# Patient Record
Sex: MALE | Race: WHITE | NOT HISPANIC OR LATINO | Employment: FULL TIME | URBAN - METROPOLITAN AREA
[De-identification: names, ages, dates, MRNs, and addresses within clinical notes are randomized per-mention and may not be internally consistent; named-entity substitution may affect disease eponyms.]

---

## 2017-09-15 ENCOUNTER — OFFICE VISIT (OUTPATIENT)
Dept: URGENT CARE | Facility: PHYSICIAN GROUP | Age: 57
End: 2017-09-15

## 2017-09-15 VITALS
BODY MASS INDEX: 31.4 KG/M2 | RESPIRATION RATE: 12 BRPM | HEART RATE: 92 BPM | OXYGEN SATURATION: 94 % | WEIGHT: 195.4 LBS | HEIGHT: 66 IN | TEMPERATURE: 98 F

## 2017-09-15 DIAGNOSIS — Z00.00 PHYSICAL EXAM, ROUTINE: ICD-10-CM

## 2017-09-15 PROCEDURE — 7100 PR DOT PHYSICAL: Performed by: PHYSICIAN ASSISTANT

## 2017-09-15 ASSESSMENT — PAIN SCALES - GENERAL: PAINLEVEL: NO PAIN

## 2018-09-18 ENCOUNTER — OFFICE VISIT (OUTPATIENT)
Dept: URGENT CARE | Facility: PHYSICIAN GROUP | Age: 58
End: 2018-09-18

## 2018-09-18 VITALS
SYSTOLIC BLOOD PRESSURE: 138 MMHG | TEMPERATURE: 97.5 F | DIASTOLIC BLOOD PRESSURE: 86 MMHG | OXYGEN SATURATION: 97 % | HEIGHT: 66 IN | HEART RATE: 86 BPM | WEIGHT: 201 LBS | BODY MASS INDEX: 32.3 KG/M2

## 2018-09-18 DIAGNOSIS — Z02.4 ENCOUNTER FOR CDL (COMMERCIAL DRIVING LICENSE) EXAM: ICD-10-CM

## 2018-09-18 PROCEDURE — 7100 PR DOT PHYSICAL: Performed by: FAMILY MEDICINE

## 2018-09-18 NOTE — PROGRESS NOTES
For complete documentation please see DOT physical form scanned into chart. Chaperone name: Ana was present.

## 2019-11-04 ENCOUNTER — TELEPHONE (OUTPATIENT)
Dept: MEDICAL GROUP | Age: 59
End: 2019-11-04

## 2019-11-04 DIAGNOSIS — E78.2 MIXED HYPERLIPIDEMIA: ICD-10-CM

## 2019-11-04 DIAGNOSIS — Z00.00 HEALTHCARE MAINTENANCE: ICD-10-CM

## 2019-11-11 RX ORDER — LISINOPRIL 40 MG/1
TABLET ORAL
Qty: 90 TAB | Refills: 4 | Status: SHIPPED | OUTPATIENT
Start: 2019-11-11 | End: 2020-11-12

## 2019-11-15 NOTE — TELEPHONE ENCOUNTER
1. Caller Name: Kit Rocha      Call Back Number: [phone] (home)         Patient approves a detailed voicemail message: N\A    Pt is requesting lab orders to complete, so he can schedule an appointment to review them

## 2019-11-15 NOTE — TELEPHONE ENCOUNTER
Phone Number Called: [phone] (home)     Call outcome: left message for patient to call back regarding message below    Message: Left message for the patient letting him know that Dr. Turner has ordered blood work.

## 2019-11-20 ENCOUNTER — HOSPITAL ENCOUNTER (OUTPATIENT)
Dept: LAB | Facility: MEDICAL CENTER | Age: 59
End: 2019-11-20
Attending: INTERNAL MEDICINE
Payer: COMMERCIAL

## 2019-11-20 DIAGNOSIS — Z00.00 HEALTHCARE MAINTENANCE: ICD-10-CM

## 2019-11-20 LAB
ALBUMIN SERPL BCP-MCNC: 4.1 G/DL (ref 3.2–4.9)
ALBUMIN/GLOB SERPL: 1.2 G/DL
ALP SERPL-CCNC: 82 U/L (ref 30–99)
ALT SERPL-CCNC: 25 U/L (ref 2–50)
ANION GAP SERPL CALC-SCNC: 8 MMOL/L (ref 0–11.9)
AST SERPL-CCNC: 23 U/L (ref 12–45)
BASOPHILS # BLD AUTO: 0.4 % (ref 0–1.8)
BASOPHILS # BLD: 0.02 K/UL (ref 0–0.12)
BILIRUB SERPL-MCNC: 0.5 MG/DL (ref 0.1–1.5)
BUN SERPL-MCNC: 15 MG/DL (ref 8–22)
CALCIUM SERPL-MCNC: 9.1 MG/DL (ref 8.5–10.5)
CHLORIDE SERPL-SCNC: 107 MMOL/L (ref 96–112)
CHOLEST SERPL-MCNC: 191 MG/DL (ref 100–199)
CO2 SERPL-SCNC: 26 MMOL/L (ref 20–33)
CREAT SERPL-MCNC: 0.94 MG/DL (ref 0.5–1.4)
EOSINOPHIL # BLD AUTO: 0.07 K/UL (ref 0–0.51)
EOSINOPHIL NFR BLD: 1.3 % (ref 0–6.9)
ERYTHROCYTE [DISTWIDTH] IN BLOOD BY AUTOMATED COUNT: 46.9 FL (ref 35.9–50)
FASTING STATUS PATIENT QL REPORTED: NORMAL
GLOBULIN SER CALC-MCNC: 3.3 G/DL (ref 1.9–3.5)
GLUCOSE SERPL-MCNC: 96 MG/DL (ref 65–99)
HCT VFR BLD AUTO: 51.6 % (ref 42–52)
HDLC SERPL-MCNC: 38 MG/DL
HGB BLD-MCNC: 17.1 G/DL (ref 14–18)
IMM GRANULOCYTES # BLD AUTO: 0.01 K/UL (ref 0–0.11)
IMM GRANULOCYTES NFR BLD AUTO: 0.2 % (ref 0–0.9)
LDLC SERPL CALC-MCNC: 132 MG/DL
LYMPHOCYTES # BLD AUTO: 1.56 K/UL (ref 1–4.8)
LYMPHOCYTES NFR BLD: 29.2 % (ref 22–41)
MCH RBC QN AUTO: 31.4 PG (ref 27–33)
MCHC RBC AUTO-ENTMCNC: 33.1 G/DL (ref 33.7–35.3)
MCV RBC AUTO: 94.7 FL (ref 81.4–97.8)
MONOCYTES # BLD AUTO: 0.56 K/UL (ref 0–0.85)
MONOCYTES NFR BLD AUTO: 10.5 % (ref 0–13.4)
NEUTROPHILS # BLD AUTO: 3.13 K/UL (ref 1.82–7.42)
NEUTROPHILS NFR BLD: 58.4 % (ref 44–72)
NRBC # BLD AUTO: 0 K/UL
NRBC BLD-RTO: 0 /100 WBC
PLATELET # BLD AUTO: 219 K/UL (ref 164–446)
PMV BLD AUTO: 11 FL (ref 9–12.9)
POTASSIUM SERPL-SCNC: 4.6 MMOL/L (ref 3.6–5.5)
PROT SERPL-MCNC: 7.4 G/DL (ref 6–8.2)
RBC # BLD AUTO: 5.45 M/UL (ref 4.7–6.1)
SODIUM SERPL-SCNC: 141 MMOL/L (ref 135–145)
TRIGL SERPL-MCNC: 103 MG/DL (ref 0–149)
WBC # BLD AUTO: 5.4 K/UL (ref 4.8–10.8)

## 2019-11-20 PROCEDURE — 80053 COMPREHEN METABOLIC PANEL: CPT

## 2019-11-20 PROCEDURE — 80061 LIPID PANEL: CPT

## 2019-11-20 PROCEDURE — 36415 COLL VENOUS BLD VENIPUNCTURE: CPT

## 2019-11-20 PROCEDURE — 85025 COMPLETE CBC W/AUTO DIFF WBC: CPT

## 2020-08-11 ENCOUNTER — HOSPITAL ENCOUNTER (OUTPATIENT)
Facility: MEDICAL CENTER | Age: 60
End: 2020-08-11
Attending: PHYSICIAN ASSISTANT
Payer: COMMERCIAL

## 2020-08-11 ENCOUNTER — TELEMEDICINE (OUTPATIENT)
Dept: MEDICAL GROUP | Age: 60
End: 2020-08-11
Payer: COMMERCIAL

## 2020-08-11 ENCOUNTER — OFFICE VISIT (OUTPATIENT)
Dept: URGENT CARE | Facility: CLINIC | Age: 60
End: 2020-08-11
Payer: COMMERCIAL

## 2020-08-11 VITALS
WEIGHT: 182 LBS | HEIGHT: 66 IN | RESPIRATION RATE: 14 BRPM | OXYGEN SATURATION: 93 % | DIASTOLIC BLOOD PRESSURE: 68 MMHG | BODY MASS INDEX: 29.25 KG/M2 | SYSTOLIC BLOOD PRESSURE: 138 MMHG | HEART RATE: 89 BPM | TEMPERATURE: 96.8 F

## 2020-08-11 VITALS
HEART RATE: 76 BPM | TEMPERATURE: 96.8 F | RESPIRATION RATE: 12 BRPM | SYSTOLIC BLOOD PRESSURE: 136 MMHG | DIASTOLIC BLOOD PRESSURE: 76 MMHG | WEIGHT: 176 LBS | BODY MASS INDEX: 28.28 KG/M2 | HEIGHT: 66 IN

## 2020-08-11 DIAGNOSIS — E78.2 MIXED HYPERLIPIDEMIA: ICD-10-CM

## 2020-08-11 DIAGNOSIS — Z20.822 EXPOSURE TO COVID-19 VIRUS: ICD-10-CM

## 2020-08-11 DIAGNOSIS — Z20.822 CLOSE EXPOSURE TO COVID-19 VIRUS: ICD-10-CM

## 2020-08-11 DIAGNOSIS — Z00.00 HEALTHCARE MAINTENANCE: ICD-10-CM

## 2020-08-11 DIAGNOSIS — I10 ESSENTIAL HYPERTENSION: ICD-10-CM

## 2020-08-11 LAB — COVID ORDER STATUS COVID19: NORMAL

## 2020-08-11 PROCEDURE — U0003 INFECTIOUS AGENT DETECTION BY NUCLEIC ACID (DNA OR RNA); SEVERE ACUTE RESPIRATORY SYNDROME CORONAVIRUS 2 (SARS-COV-2) (CORONAVIRUS DISEASE [COVID-19]), AMPLIFIED PROBE TECHNIQUE, MAKING USE OF HIGH THROUGHPUT TECHNOLOGIES AS DESCRIBED BY CMS-2020-01-R: HCPCS

## 2020-08-11 PROCEDURE — 99213 OFFICE O/P EST LOW 20 MIN: CPT | Mod: 95,CR | Performed by: INTERNAL MEDICINE

## 2020-08-11 PROCEDURE — 99213 OFFICE O/P EST LOW 20 MIN: CPT | Mod: CS | Performed by: PHYSICIAN ASSISTANT

## 2020-08-11 SDOH — HEALTH STABILITY: MENTAL HEALTH: HOW OFTEN DO YOU HAVE A DRINK CONTAINING ALCOHOL?: 2-4 TIMES A MONTH

## 2020-08-11 ASSESSMENT — FIBROSIS 4 INDEX
FIB4 SCORE: 1.22
FIB4 SCORE: 1.22

## 2020-08-11 ASSESSMENT — ENCOUNTER SYMPTOMS
COUGH: 1
FEVER: 0
SHORTNESS OF BREATH: 0

## 2020-08-11 NOTE — PROGRESS NOTES
"  Subjective:   Kit Rocha is a 60 y.o. male who presents today with   Chief Complaint   Patient presents with   • Runny Nose     x couple days - daughter tested and positive        Other  This is a new problem. Episode onset: 2 days. The problem occurs constantly. The problem has been unchanged. Associated symptoms include congestion and coughing. Pertinent negatives include no chest pain or fever. Associated symptoms comments: Runny nose. Nothing aggravates the symptoms. He has tried nothing for the symptoms. The treatment provided no relief.     Patient's daughter tested positive for COVID.  PMH:  has a past medical history of Hypertension. He also has no past medical history of Clotting disorder (HCC) or Diabetes (HCC).  MEDS:   Current Outpatient Medications:   •  lisinopril (PRINIVIL) 40 MG tablet, TAKE 1 TABLET BY MOUTH EVERY DAY, Disp: 90 Tab, Rfl: 4  ALLERGIES: No Known Allergies  SURGHX:   Past Surgical History:   Procedure Laterality Date   • HERNIA REPAIR       SOCHX:  reports that he has never smoked. He has never used smokeless tobacco. He reports previous alcohol use. He reports that he does not use drugs.  FH: Reviewed with patient, not pertinent to this visit.       Review of Systems   Constitutional: Negative for fever.   HENT: Positive for congestion.    Respiratory: Positive for cough. Negative for shortness of breath.    Cardiovascular: Negative for chest pain.   All other systems reviewed and are negative.       Objective:   /68 (BP Location: Left arm, Patient Position: Sitting, BP Cuff Size: Adult)   Pulse 89   Temp 36 °C (96.8 °F) (Temporal)   Resp 14   Ht 1.676 m (5' 6\")   Wt 82.6 kg (182 lb)   SpO2 93%   BMI 29.38 kg/m²   Physical Exam  Vitals signs and nursing note reviewed.   Constitutional:       General: He is not in acute distress.     Appearance: Normal appearance. He is well-developed and normal weight. He is not ill-appearing or toxic-appearing.   HENT:      Head: " Normocephalic and atraumatic.      Right Ear: Hearing, tympanic membrane and ear canal normal.      Left Ear: Hearing, tympanic membrane and ear canal normal.      Nose: Rhinorrhea present.      Mouth/Throat:      Pharynx: Posterior oropharyngeal erythema present. No oropharyngeal exudate.   Eyes:      Pupils: Pupils are equal, round, and reactive to light.   Cardiovascular:      Rate and Rhythm: Normal rate and regular rhythm.      Heart sounds: Normal heart sounds.   Pulmonary:      Effort: Pulmonary effort is normal. No respiratory distress.      Breath sounds: Normal breath sounds. No stridor. No wheezing, rhonchi or rales.   Chest:      Chest wall: No tenderness.   Musculoskeletal:      Comments: Normal movement in all 4 extremities   Lymphadenopathy:      Cervical: No cervical adenopathy.   Skin:     General: Skin is warm and dry.   Neurological:      Mental Status: He is alert.      Coordination: Coordination normal.   Psychiatric:         Mood and Affect: Mood normal.           Assessment/Plan:   Assessment    1. Close exposure to COVID-19 virus  - COVID/SARS COV-2 PCR; Future  Patient encouraged to get plenty of rest, use OTC tylenol for pain/fever, and drink plenty of fluids.  Discussed CDC guidelines and self quarantine.  Differential diagnosis, natural history, supportive care, and indications for immediate follow-up discussed.   Patient given instructions and understanding of medications and treatment.    If not improving in 3-5 days, F/U with PCP or return to  if symptoms worsen.    Patient agreeable to plan.      Please note that this dictation was created using voice recognition software. I have made every reasonable attempt to correct obvious errors, but I expect that there are errors of grammar and possibly content that I did not discover before finalizing the note.    Geoff Goode PA-C

## 2020-08-11 NOTE — PROGRESS NOTES
"Telemedicine Visit: Established Patient     This evaluation was conducted via Zoom, using secure and encrypted videoconferencing technology.  The patient was physical located at Home in [place], [place] and the physician was located in [facility] urgent [address].  The patient was presented by self, at home.  The patient's identity was confirmed and verbal consent for the telemedicine encounter was obtained.      Subjective:   CC: Med management and lab review and recent exposure to COVID-19 by daughter who lives next door 2 days ago.  Patient developing runny nose and cough.  Kit Rocha is a 60 y.o. male presenting for evaluation and management of:  And  The patient is here for followup of chronic medical problems listed below. The patient is compliant with medications and having no side effects from them. Denies chest pain, abdominal pain, dyspnea, myalgias,           ROS     Denies any recent fevers or chills. No nausea or vomiting. No chest pains or shortness of breath.     No Known Allergies    Current medicines (including changes today)  Current Outpatient Medications   Medication Sig Dispense Refill   • lisinopril (PRINIVIL) 40 MG tablet TAKE 1 TABLET BY MOUTH EVERY DAY 90 Tab 4     No current facility-administered medications for this visit.        Patient Active Problem List    Diagnosis Date Noted   • HTN (hypertension) 06/07/2013   • HLD (hyperlipidemia) 06/07/2013   • BMI 30.0-30.9,adult 06/07/2013       History reviewed. No pertinent family history.    He  has a past medical history of Hypertension. He also has no past medical history of Clotting disorder (HCC) or Diabetes (HCC).  He  has a past surgical history that includes hernia repair.       Objective:   /76 (BP Location: Left arm, Patient Position: Sitting, BP Cuff Size: Adult)   Pulse 76   Temp 36 °C (96.8 °F)   Resp 12   Ht 1.676 m (5' 6\")   Wt 79.8 kg (176 lb) Comment: Pt stated  BMI 28.41 kg/m²     Physical Exam:     BP " "136/76 (BP Location: Left arm, Patient Position: Sitting, BP Cuff Size: Adult)   Pulse 76   Temp 36 °C (96.8 °F)   Resp 12   Ht 1.676 m (5' 6\")   Wt 79.8 kg (176 lb) Comment: Pt stated  BMI 28.41 kg/m²     Constitutional: Alert, no distress, well-groomed.  Skin: No rashes in visible areas.  Eye: Round. Conjunctiva clear, lids normal. No icterus.   ENMT: Lips pink without lesions, good dentition, moist mucous membranes. Phonation normal.  Neck: No masses, no thyromegaly. Moves freely without pain.  CV: Pulse as reported by patient  Respiratory: Unlabored respiratory effort, no cough or audible wheeze  Psych: Alert and oriented x3, normal affect and mood.       Assessment and Plan:   The following treatment plan was discussed:     1. Mixed hyperlipidemiaUnder good control. Continue same regimen.  - TSH; Future  - Comp Metabolic Panel; Future  - Lipid Profile; Future  - CBC WITH DIFFERENTIAL; Future    2. Essential hypertensionUnder good control. Continue same regimen.  - Comp Metabolic Panel; Future  - Lipid Profile; Future    3. Exposure to COVID-19 virusUnder good control. Continue same regimen.  - Miscellaneous Test; Future  - Miscellaneous Test; Future    4. Healthcare maintenanceUnder good control. Continue same regimen.  - TSH; Future  - Comp Metabolic Panel; Future  - Lipid Profile; Future  - CBC WITH DIFFERENTIAL; Future        Follow-up: No follow-ups on file.         "

## 2020-08-12 LAB
SARS-COV-2 RNA RESP QL NAA+PROBE: NOTDETECTED
SPECIMEN SOURCE: NORMAL

## 2020-11-12 RX ORDER — LISINOPRIL 40 MG/1
TABLET ORAL
Qty: 90 TAB | Refills: 4 | Status: SHIPPED | OUTPATIENT
Start: 2020-11-12 | End: 2021-11-22

## 2020-12-15 ENCOUNTER — OFFICE VISIT (OUTPATIENT)
Dept: URGENT CARE | Facility: PHYSICIAN GROUP | Age: 60
End: 2020-12-15

## 2020-12-15 VITALS
HEIGHT: 66 IN | BODY MASS INDEX: 29.73 KG/M2 | WEIGHT: 185 LBS | DIASTOLIC BLOOD PRESSURE: 84 MMHG | OXYGEN SATURATION: 94 % | SYSTOLIC BLOOD PRESSURE: 144 MMHG | HEART RATE: 90 BPM | TEMPERATURE: 97.6 F | RESPIRATION RATE: 14 BRPM

## 2020-12-15 DIAGNOSIS — Z02.4 ENCOUNTER FOR CDL (COMMERCIAL DRIVING LICENSE) EXAM: ICD-10-CM

## 2020-12-15 PROCEDURE — 7100 PR DOT PHYSICAL: Performed by: NURSE PRACTITIONER

## 2020-12-15 ASSESSMENT — FIBROSIS 4 INDEX: FIB4 SCORE: 1.22

## 2020-12-15 NOTE — PROGRESS NOTES
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 Pt takes lisinopril 40mg daily for HTN. Pt states they have been in good health with no infections or illnesses lately. PT denies significant PMH and PSH. Pt denies CP, SOB, NVD, paresthesias, headaches, dizziness, change in vision, hives, or joint pain. Pt states current pain is 0/10. The pt's medication list, problem list, and allergies have been evaluated and reviewed during today's visit.    Constitutional: Negative for fever, chills and malaise/fatigue.   HENT: Negative for congestion and sore throat.    Eyes: Negative for blurred vision, double vision and photophobia.   Respiratory: Negative for cough and shortness of breath.  Cardiovascular: Negative for chest pain and palpitations.   Gastrointestinal: Negative for heartburn, nausea, vomiting, abdominal pain, diarrhea and constipation.   Genitourinary: Negative for dysuria and flank pain.   Musculoskeletal: Negative for joint pain and myalgias.   Skin: Negative for itching and rash.   Neurological: Negative for dizziness, tingling and headaches.   Endo/Heme/Allergies: Does not bruise/bleed easily.   Psychiatric/Behavioral: Negative for depression. The patient is not nervous/anxious.      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  Pt is cleared for driving at this time. - cleared for 2 years  AVS with medical info given.  Pt was in full understanding and agreement with the plan.  Follow-up as needed if symptoms worsen or fail to improve.

## 2021-02-06 ENCOUNTER — HOSPITAL ENCOUNTER (OUTPATIENT)
Dept: LAB | Facility: MEDICAL CENTER | Age: 61
End: 2021-02-06
Attending: INTERNAL MEDICINE
Payer: COMMERCIAL

## 2021-02-06 DIAGNOSIS — I10 ESSENTIAL HYPERTENSION: ICD-10-CM

## 2021-02-06 DIAGNOSIS — E78.2 MIXED HYPERLIPIDEMIA: ICD-10-CM

## 2021-02-06 DIAGNOSIS — Z00.00 HEALTHCARE MAINTENANCE: ICD-10-CM

## 2021-02-06 DIAGNOSIS — Z20.822 EXPOSURE TO COVID-19 VIRUS: ICD-10-CM

## 2021-02-06 LAB
ALBUMIN SERPL BCP-MCNC: 4.2 G/DL (ref 3.2–4.9)
ALBUMIN/GLOB SERPL: 1.2 G/DL
ALP SERPL-CCNC: 97 U/L (ref 30–99)
ALT SERPL-CCNC: 27 U/L (ref 2–50)
ANION GAP SERPL CALC-SCNC: 8 MMOL/L (ref 7–16)
AST SERPL-CCNC: 17 U/L (ref 12–45)
BASOPHILS # BLD AUTO: 0.5 % (ref 0–1.8)
BASOPHILS # BLD: 0.02 K/UL (ref 0–0.12)
BILIRUB SERPL-MCNC: 0.5 MG/DL (ref 0.1–1.5)
BUN SERPL-MCNC: 16 MG/DL (ref 8–22)
CALCIUM SERPL-MCNC: 9.5 MG/DL (ref 8.5–10.5)
CHLORIDE SERPL-SCNC: 100 MMOL/L (ref 96–112)
CHOLEST SERPL-MCNC: 185 MG/DL (ref 100–199)
CO2 SERPL-SCNC: 23 MMOL/L (ref 20–33)
CREAT SERPL-MCNC: 0.88 MG/DL (ref 0.5–1.4)
EOSINOPHIL # BLD AUTO: 0.11 K/UL (ref 0–0.51)
EOSINOPHIL NFR BLD: 2.5 % (ref 0–6.9)
ERYTHROCYTE [DISTWIDTH] IN BLOOD BY AUTOMATED COUNT: 46.5 FL (ref 35.9–50)
FASTING STATUS PATIENT QL REPORTED: NORMAL
GLOBULIN SER CALC-MCNC: 3.4 G/DL (ref 1.9–3.5)
GLUCOSE SERPL-MCNC: 95 MG/DL (ref 65–99)
HCT VFR BLD AUTO: 52.5 % (ref 42–52)
HDLC SERPL-MCNC: 40 MG/DL
HGB BLD-MCNC: 17.1 G/DL (ref 14–18)
IMM GRANULOCYTES # BLD AUTO: 0.02 K/UL (ref 0–0.11)
IMM GRANULOCYTES NFR BLD AUTO: 0.5 % (ref 0–0.9)
LDLC SERPL CALC-MCNC: 128 MG/DL
LYMPHOCYTES # BLD AUTO: 1.2 K/UL (ref 1–4.8)
LYMPHOCYTES NFR BLD: 27.6 % (ref 22–41)
MCH RBC QN AUTO: 30.8 PG (ref 27–33)
MCHC RBC AUTO-ENTMCNC: 32.6 G/DL (ref 33.7–35.3)
MCV RBC AUTO: 94.6 FL (ref 81.4–97.8)
MONOCYTES # BLD AUTO: 0.45 K/UL (ref 0–0.85)
MONOCYTES NFR BLD AUTO: 10.3 % (ref 0–13.4)
NEUTROPHILS # BLD AUTO: 2.55 K/UL (ref 1.82–7.42)
NEUTROPHILS NFR BLD: 58.6 % (ref 44–72)
NRBC # BLD AUTO: 0 K/UL
NRBC BLD-RTO: 0 /100 WBC
PLATELET # BLD AUTO: 226 K/UL (ref 164–446)
PMV BLD AUTO: 10.7 FL (ref 9–12.9)
POTASSIUM SERPL-SCNC: 4.7 MMOL/L (ref 3.6–5.5)
PROT SERPL-MCNC: 7.6 G/DL (ref 6–8.2)
RBC # BLD AUTO: 5.55 M/UL (ref 4.7–6.1)
SODIUM SERPL-SCNC: 131 MMOL/L (ref 135–145)
TRIGL SERPL-MCNC: 84 MG/DL (ref 0–149)
TSH SERPL DL<=0.005 MIU/L-ACNC: 1.68 UIU/ML (ref 0.38–5.33)
WBC # BLD AUTO: 4.4 K/UL (ref 4.8–10.8)

## 2021-02-06 PROCEDURE — 84443 ASSAY THYROID STIM HORMONE: CPT

## 2021-02-06 PROCEDURE — 80061 LIPID PANEL: CPT

## 2021-02-06 PROCEDURE — 80053 COMPREHEN METABOLIC PANEL: CPT

## 2021-02-06 PROCEDURE — 85025 COMPLETE CBC W/AUTO DIFF WBC: CPT

## 2021-02-06 PROCEDURE — 36415 COLL VENOUS BLD VENIPUNCTURE: CPT

## 2021-03-15 ENCOUNTER — TELEPHONE (OUTPATIENT)
Dept: MEDICAL GROUP | Age: 61
End: 2021-03-15

## 2021-03-15 DIAGNOSIS — Z12.11 SCREEN FOR COLON CANCER: ICD-10-CM

## 2021-03-15 DIAGNOSIS — Z23 NEED FOR VACCINATION: ICD-10-CM

## 2021-03-15 NOTE — TELEPHONE ENCOUNTER
1. Caller Name: Kit Rocha                        Call Back Number: [phone] (home)       How would the patient prefer to be contacted with a response: Phone call OK to leave a detailed message    2. SPECIFIC Action To Be Taken: Referral pending, please sign.    3. Diagnosis/Clinical Reason for Request: Colonscopy    4. Specialty & Provider Name/Lab/Imaging Location: GI    5. Is appointment scheduled for requested order/referral: no    Patient was informed they will receive a return phone call from the office ONLY if there are any questions before processing their request. Advised to call back if they haven't received a call from the referral department in 5 days.

## 2021-11-11 ENCOUNTER — TELEPHONE (OUTPATIENT)
Dept: MEDICAL GROUP | Age: 61
End: 2021-11-11

## 2021-11-11 DIAGNOSIS — E78.2 MIXED HYPERLIPIDEMIA: ICD-10-CM

## 2021-11-11 NOTE — TELEPHONE ENCOUNTER
1. Caller Name: Kit Rocha                          Call Back Number: [phone] (home) [phone] (work)        How would the patient prefer to be contacted with a response: Phone call OK to leave a detailed message      Patient called and stated that he would like to to place a order for a  full blood panel work up for him and also a prostate screening?       Please Advise?

## 2021-11-20 ENCOUNTER — HOSPITAL ENCOUNTER (OUTPATIENT)
Dept: LAB | Facility: MEDICAL CENTER | Age: 61
End: 2021-11-20
Attending: INTERNAL MEDICINE
Payer: COMMERCIAL

## 2021-11-20 DIAGNOSIS — E78.2 MIXED HYPERLIPIDEMIA: ICD-10-CM

## 2021-11-20 LAB
ALBUMIN SERPL BCP-MCNC: 4.7 G/DL (ref 3.2–4.9)
ALBUMIN/GLOB SERPL: 1.5 G/DL
ALP SERPL-CCNC: 104 U/L (ref 30–99)
ALT SERPL-CCNC: 23 U/L (ref 2–50)
ANION GAP SERPL CALC-SCNC: 9 MMOL/L (ref 7–16)
AST SERPL-CCNC: 22 U/L (ref 12–45)
BASOPHILS # BLD AUTO: 0.5 % (ref 0–1.8)
BASOPHILS # BLD: 0.03 K/UL (ref 0–0.12)
BILIRUB SERPL-MCNC: 0.7 MG/DL (ref 0.1–1.5)
BUN SERPL-MCNC: 15 MG/DL (ref 8–22)
CALCIUM SERPL-MCNC: 9.4 MG/DL (ref 8.5–10.5)
CHLORIDE SERPL-SCNC: 102 MMOL/L (ref 96–112)
CHOLEST SERPL-MCNC: 203 MG/DL (ref 100–199)
CO2 SERPL-SCNC: 25 MMOL/L (ref 20–33)
CREAT SERPL-MCNC: 0.87 MG/DL (ref 0.5–1.4)
EOSINOPHIL # BLD AUTO: 0.12 K/UL (ref 0–0.51)
EOSINOPHIL NFR BLD: 1.9 % (ref 0–6.9)
ERYTHROCYTE [DISTWIDTH] IN BLOOD BY AUTOMATED COUNT: 45.8 FL (ref 35.9–50)
FASTING STATUS PATIENT QL REPORTED: NORMAL
GLOBULIN SER CALC-MCNC: 3.2 G/DL (ref 1.9–3.5)
GLUCOSE SERPL-MCNC: 102 MG/DL (ref 65–99)
HCT VFR BLD AUTO: 51.5 % (ref 42–52)
HDLC SERPL-MCNC: 45 MG/DL
HGB BLD-MCNC: 17.3 G/DL (ref 14–18)
IMM GRANULOCYTES # BLD AUTO: 0.03 K/UL (ref 0–0.11)
IMM GRANULOCYTES NFR BLD AUTO: 0.5 % (ref 0–0.9)
LDLC SERPL CALC-MCNC: 141 MG/DL
LYMPHOCYTES # BLD AUTO: 1.46 K/UL (ref 1–4.8)
LYMPHOCYTES NFR BLD: 22.9 % (ref 22–41)
MCH RBC QN AUTO: 30.8 PG (ref 27–33)
MCHC RBC AUTO-ENTMCNC: 33.6 G/DL (ref 33.7–35.3)
MCV RBC AUTO: 91.6 FL (ref 81.4–97.8)
MONOCYTES # BLD AUTO: 0.6 K/UL (ref 0–0.85)
MONOCYTES NFR BLD AUTO: 9.4 % (ref 0–13.4)
NEUTROPHILS # BLD AUTO: 4.14 K/UL (ref 1.82–7.42)
NEUTROPHILS NFR BLD: 64.8 % (ref 44–72)
NRBC # BLD AUTO: 0 K/UL
NRBC BLD-RTO: 0 /100 WBC
PLATELET # BLD AUTO: 224 K/UL (ref 164–446)
PMV BLD AUTO: 10.6 FL (ref 9–12.9)
POTASSIUM SERPL-SCNC: 4.6 MMOL/L (ref 3.6–5.5)
PROT SERPL-MCNC: 7.9 G/DL (ref 6–8.2)
RBC # BLD AUTO: 5.62 M/UL (ref 4.7–6.1)
SODIUM SERPL-SCNC: 136 MMOL/L (ref 135–145)
TRIGL SERPL-MCNC: 85 MG/DL (ref 0–149)
TSH SERPL DL<=0.005 MIU/L-ACNC: 1.76 UIU/ML (ref 0.38–5.33)
WBC # BLD AUTO: 6.4 K/UL (ref 4.8–10.8)

## 2021-11-20 PROCEDURE — 84443 ASSAY THYROID STIM HORMONE: CPT

## 2021-11-20 PROCEDURE — 80053 COMPREHEN METABOLIC PANEL: CPT

## 2021-11-20 PROCEDURE — 85025 COMPLETE CBC W/AUTO DIFF WBC: CPT

## 2021-11-20 PROCEDURE — 36415 COLL VENOUS BLD VENIPUNCTURE: CPT

## 2021-11-20 PROCEDURE — 80061 LIPID PANEL: CPT

## 2021-11-22 RX ORDER — LISINOPRIL 40 MG/1
TABLET ORAL
Qty: 90 TABLET | Refills: 4 | Status: SHIPPED | OUTPATIENT
Start: 2021-11-22 | End: 2022-12-06

## 2022-07-30 ENCOUNTER — HOSPITAL ENCOUNTER (OUTPATIENT)
Facility: MEDICAL CENTER | Age: 62
End: 2022-07-30
Payer: COMMERCIAL

## 2022-07-30 ENCOUNTER — OFFICE VISIT (OUTPATIENT)
Dept: URGENT CARE | Facility: PHYSICIAN GROUP | Age: 62
End: 2022-07-30
Payer: COMMERCIAL

## 2022-07-30 VITALS
DIASTOLIC BLOOD PRESSURE: 78 MMHG | SYSTOLIC BLOOD PRESSURE: 118 MMHG | RESPIRATION RATE: 18 BRPM | HEART RATE: 91 BPM | TEMPERATURE: 97.2 F | WEIGHT: 175 LBS | OXYGEN SATURATION: 96 % | BODY MASS INDEX: 28.12 KG/M2 | HEIGHT: 66 IN

## 2022-07-30 DIAGNOSIS — J34.0 NASAL SEPTUM ULCERATION: ICD-10-CM

## 2022-07-30 DIAGNOSIS — R05.9 COUGH: ICD-10-CM

## 2022-07-30 DIAGNOSIS — U07.1 COVID: ICD-10-CM

## 2022-07-30 LAB
EXTERNAL QUALITY CONTROL: ABNORMAL
SARS-COV+SARS-COV-2 AG RESP QL IA.RAPID: POSITIVE

## 2022-07-30 PROCEDURE — 87426 SARSCOV CORONAVIRUS AG IA: CPT

## 2022-07-30 PROCEDURE — U0005 INFEC AGEN DETEC AMPLI PROBE: HCPCS

## 2022-07-30 PROCEDURE — U0003 INFECTIOUS AGENT DETECTION BY NUCLEIC ACID (DNA OR RNA); SEVERE ACUTE RESPIRATORY SYNDROME CORONAVIRUS 2 (SARS-COV-2) (CORONAVIRUS DISEASE [COVID-19]), AMPLIFIED PROBE TECHNIQUE, MAKING USE OF HIGH THROUGHPUT TECHNOLOGIES AS DESCRIBED BY CMS-2020-01-R: HCPCS

## 2022-07-30 PROCEDURE — 99213 OFFICE O/P EST LOW 20 MIN: CPT | Mod: CS

## 2022-07-30 RX ORDER — PREDNISONE 20 MG/1
40 TABLET ORAL DAILY
Qty: 10 TABLET | Refills: 0 | Status: SHIPPED | OUTPATIENT
Start: 2022-07-30 | End: 2022-08-04

## 2022-07-30 ASSESSMENT — FIBROSIS 4 INDEX: FIB4 SCORE: 1.23

## 2022-07-30 NOTE — LETTER
July 30, 2022    To Whom It May Concern:      Your employee was seen in our clinic today.  A concern for COVID-19 has been identified and testing is in progress. We are asking you to excuse absences while following self-isolation protocol per Center for Disease Control (CDC) guidelines.  Your employee will be able to access test results through our electronic delivery system called [employer].     The results of testing are positive, your employee should follow the CDC guidelines.  These are isolation for a minimum of 5 days and at least 24 hours have passed since your last fever without the use of fever-reducing medications and all other symptoms have improved.  After completing the isolation the patient must continue to use a well fitting mask for an additional 5 days.  The health department may contact you and provide further directions regarding self-isolation and return to work.     This is the only note that will be provided from [facility] for this visit.  Your employee will require an appointment with a primary care provider if FMLA or disability forms are required.  If you have any questions please do not hesitate to call me at the phone number listed below.    Sincerely,    Susu Snowden, CONOR  [phone]  (signed electronically)

## 2022-07-30 NOTE — LETTER
7/30/2022               Kit Aj  [address] [address]        Dear Kit (MR#[id number]),    This letter is to inform you that your COVID-19 test result is POSITIVE.  This means that the virus that causes COVID-19 was found in your sample.      Results for orders placed or performed in visit on 07/30/22   POCT SARS-COV Antigen HUI (Symptomatic only)   Result Value Ref Range    Internal  Valid     SARS-COV ANTIGEN HUI Positive (A) Negative, Indeterminate, None Detected, Valid, Invalid, Pass         If your symptoms are generally mild and stable, please isolate yourself at home. If it becomes difficult to breathe, contact your healthcare provider as soon as possible.    Next steps:  -  Stay home except to get medical care.  -  Follow the instructions for home isolation below.  -  Call ahead before visiting your healthcare provider or a hospital.  -  Go to the nearest emergency department for worsening symptoms including difficulty breathing, ongoing fever, weakness or chest pain.    You should isolate yourself:  -  For a minimum of 5 days from symptom onset or positive test and  -  At least 24 hours have passed since your last fever without the use of fever-reducing medications and  -  All other symptoms have improved (loss of taste and smell may persist for weeks or months after recovery and should not delay the end of isolation)    Instructions for Self-Isolation at Home:  -  We recommend you stay in your home and minimize contact with others to avoid spreading this infection.  -  Do not go to work, school or public areas unless otherwise directed by the health department. Avoid using public transportation, ridesharing or taxis.  -  Separate yourself from other people in your home as much as possible.  -  Stay in a specific room and away from other people in your home as much as possible. Use a separate bathroom if possible.        When seeking care at a healthcare facility:  -  Seek  prompt medical attention if your illness is worsening (e.g., difficulty breathing).  -  When possible, call the healthcare provider before arriving.  -  Put on a facemask before you enter the facility.  -  If possible, put on a facemask before the ambulance or paramedics arrive.  -  These steps will help the healthcare provider's office prevent other people from getting infected or exposed.    Once any symptoms have resolved, it may be possible to donate plasma to help others that are currently ill with COVID-19. To learn more and apply, please contact the  at [phone] or via e-mail at [email or web address].    For any further questions regarding COVID-19, please contact your [facility]'s health department or healthcare provider.        Sincerely,    [facility]

## 2022-07-30 NOTE — PROGRESS NOTES
Subjective:   Kit Rocha is a 62 y.o. male who presents for Coronavirus Screening (X2day feeling tired, work requesting pt take a test, headache, scratchy throat, bopdy aches and chills.)      HPI:  Patient presents with complaints of fatigue, headache, sore throat, body aches, chills.  Patient is requesting COVID testing for his job.  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  Patient states onset of symptoms was on Thursday.  Patient has not taken any home COVID test.  Per patient his wife tested positive for COVID approximately a week ago.  Patient endorses chills but denies cough, fever, night sweats, nausea, vomiting, diarrhea.     Secondary to the above patient is also requesting provider examine his right nare.  Patient states he has a patch of skin along his right septum that is easily removed resulting in recurrent bleeding.  Patient states he has had this for 8 years.  Patient has not established with a current ENT.       ROS as above per HPI    Medications:    Current Outpatient Medications on File Prior to Visit   Medication Sig Dispense Refill   • lisinopril (PRINIVIL) 40 MG tablet TAKE 1 TABLET BY MOUTH EVERY DAY 90 Tablet 4     No current facility-administered medications on file prior to visit.        Allergies:   Patient has no known allergies.    Problem List:   Patient Active Problem List   Diagnosis   • HTN (hypertension)   • HLD (hyperlipidemia)   • BMI 30.0-30.9,adult        Surgical History:  Past Surgical History:   Procedure Laterality Date   • HERNIA REPAIR         Past Social Hx:   Social History     Tobacco Use   • Smoking status: Never Smoker   • Smokeless tobacco: Never Used   Vaping Use   • Vaping Use: Never used   Substance Use Topics   • Alcohol use: Not Currently     Comment: Occ   • Drug use: No          Problem list, medications, and allergies reviewed by myself today in Epic.     Objective:     /78   Pulse 91   Temp 36.2 °C (97.2 °F) (Tympanic)   Resp 18   Ht 1.676 m (5'  "6\")   Wt 79.4 kg (175 lb)   SpO2 96%   BMI 28.25 kg/m²     Physical Exam  Vitals reviewed.   Constitutional:       General: He is not in acute distress.     Appearance: Normal appearance. He is not ill-appearing or diaphoretic.   HENT:      Head: Normocephalic and atraumatic.      Right Ear: Tympanic membrane and ear canal normal.      Left Ear: Tympanic membrane and ear canal normal.      Nose: Congestion and rhinorrhea present. Rhinorrhea is clear.      Right Sinus: No maxillary sinus tenderness or frontal sinus tenderness.      Left Sinus: No maxillary sinus tenderness or frontal sinus tenderness.      Comments: The right nasal septum has an ulcerated appearance.      Mouth/Throat:      Mouth: Mucous membranes are moist.      Pharynx: Oropharynx is clear. Posterior oropharyngeal erythema present. No oropharyngeal exudate.   Eyes:      Conjunctiva/sclera: Conjunctivae normal.      Pupils: Pupils are equal, round, and reactive to light.   Cardiovascular:      Rate and Rhythm: Normal rate and regular rhythm.      Pulses: Normal pulses.      Heart sounds: Normal heart sounds.   Pulmonary:      Effort: Pulmonary effort is normal.      Breath sounds: Normal breath sounds.   Abdominal:      General: Abdomen is flat. Bowel sounds are normal.      Palpations: Abdomen is soft.   Musculoskeletal:         General: Normal range of motion.      Cervical back: No rigidity.   Lymphadenopathy:      Cervical: Cervical adenopathy present.   Skin:     General: Skin is warm and dry.      Capillary Refill: Capillary refill takes less than 2 seconds.   Neurological:      Mental Status: He is alert and oriented to person, place, and time.         Assessment/Plan:     Diagnosis and associated orders:   1. COVID  - predniSONE (DELTASONE) 20 MG Tab; Take 2 Tablets by mouth every day for 5 days.  Dispense: 10 Tablet; Refill: 0    2. Cough  - POCT SARS-COV Antigen HUI (Symptomatic only)  - COVID/SARS CoV-2 PCR; Future    3. Nasal septum " ulceration  - Referral to ENT        Comments/MDM:     Supportive care occurred, patient declined antiviral medications for COVID today, red flag symptoms referred with patient with strict return to ER precautions discussed.  Patient will be referred to ENT for ulceration of the right nasal septum.  Patient is to follow-up with his PCP.        Pt is clinically stable at today's acute urgent care visit.  No acute distress noted. Appropriate for outpatient management at this time.       • Discussed DDx, management options (risks,benefits, and alternatives to planned treatment), natural progression and supportive care.  Expressed understanding and the treatment plan was agreed upon. Questions were encouraged and answered   • Return to urgent care prn if new or worsening sx or if there is no improvement in condition prn.    • Educated in Red flags and indications to immediately call 911 or present to the Emergency Department.   • Advised the patient to follow-up with the primary care physician for recheck, reevaluation, and consideration of further management.      Please note that this dictation was created using voice recognition software. I have made a reasonable attempt to correct obvious errors, but I expect that there are errors of grammar and possibly content that I did not discover before finalizing the note.    This note was electronically signed by Susu Snowden, CONOR

## 2022-07-31 LAB
COVID ORDER STATUS COVID19: NORMAL
SARS-COV-2 RNA RESP QL NAA+PROBE: DETECTED
SPECIMEN SOURCE: ABNORMAL

## 2022-10-19 ENCOUNTER — APPOINTMENT (RX ONLY)
Dept: URBAN - METROPOLITAN AREA CLINIC 6 | Facility: CLINIC | Age: 62
Setting detail: DERMATOLOGY
End: 2022-10-19

## 2022-10-19 DIAGNOSIS — D22 MELANOCYTIC NEVI: ICD-10-CM

## 2022-10-19 DIAGNOSIS — L57.0 ACTINIC KERATOSIS: ICD-10-CM

## 2022-10-19 DIAGNOSIS — Z71.89 OTHER SPECIFIED COUNSELING: ICD-10-CM

## 2022-10-19 DIAGNOSIS — L82.1 OTHER SEBORRHEIC KERATOSIS: ICD-10-CM

## 2022-10-19 DIAGNOSIS — D18.0 HEMANGIOMA: ICD-10-CM

## 2022-10-19 DIAGNOSIS — L81.4 OTHER MELANIN HYPERPIGMENTATION: ICD-10-CM

## 2022-10-19 PROBLEM — D22.5 MELANOCYTIC NEVI OF TRUNK: Status: ACTIVE | Noted: 2022-10-19

## 2022-10-19 PROBLEM — D18.01 HEMANGIOMA OF SKIN AND SUBCUTANEOUS TISSUE: Status: ACTIVE | Noted: 2022-10-19

## 2022-10-19 PROCEDURE — ? LIQUID NITROGEN

## 2022-10-19 PROCEDURE — 17000 DESTRUCT PREMALG LESION: CPT

## 2022-10-19 PROCEDURE — 17003 DESTRUCT PREMALG LES 2-14: CPT

## 2022-10-19 PROCEDURE — 99203 OFFICE O/P NEW LOW 30 MIN: CPT | Mod: 25

## 2022-10-19 PROCEDURE — ? COUNSELING

## 2022-10-19 ASSESSMENT — LOCATION DETAILED DESCRIPTION DERM
LOCATION DETAILED: PERIUMBILICAL SKIN
LOCATION DETAILED: LEFT INFERIOR CENTRAL MALAR CHEEK
LOCATION DETAILED: LEFT MEDIAL INFERIOR CHEST
LOCATION DETAILED: LEFT TRAGUS
LOCATION DETAILED: LEFT INCISURA INTERTRAGICA
LOCATION DETAILED: LEFT LATERAL UPPER BACK
LOCATION DETAILED: LEFT ULNAR DORSAL HAND
LOCATION DETAILED: NASAL INFRATIP
LOCATION DETAILED: RIGHT RADIAL DORSAL HAND
LOCATION DETAILED: EPIGASTRIC SKIN
LOCATION DETAILED: STERNUM

## 2022-10-19 ASSESSMENT — LOCATION SIMPLE DESCRIPTION DERM
LOCATION SIMPLE: CHEST
LOCATION SIMPLE: LEFT UPPER BACK
LOCATION SIMPLE: LEFT HAND
LOCATION SIMPLE: NOSE
LOCATION SIMPLE: RIGHT HAND
LOCATION SIMPLE: ABDOMEN
LOCATION SIMPLE: LEFT CHEEK
LOCATION SIMPLE: LEFT EAR

## 2022-10-19 ASSESSMENT — LOCATION ZONE DERM
LOCATION ZONE: HAND
LOCATION ZONE: FACE
LOCATION ZONE: NOSE
LOCATION ZONE: TRUNK
LOCATION ZONE: EAR

## 2022-10-19 NOTE — PROCEDURE: LIQUID NITROGEN
Consent: The patient's consent was obtained including but not limited to risks of crusting, scabbing, blistering, scarring, darker or lighter pigmentary change, recurrence, incomplete removal and infection.
Show Aperture Variable?: Yes
Detail Level: Detailed
Render Post-Care Instructions In Note?: no
Number Of Freeze-Thaw Cycles: 3 freeze-thaw cycles
Post-Care Instructions: I reviewed with the patient in detail post-care instructions. Patient is to wear sunprotection, and avoid picking at any of the treated lesions. Pt may apply Vaseline to crusted or scabbing areas.
Duration Of Freeze Thaw-Cycle (Seconds): 3
Application Tool (Optional): Liquid Nitrogen Sprayer

## 2022-12-06 ENCOUNTER — OFFICE VISIT (OUTPATIENT)
Dept: URGENT CARE | Facility: PHYSICIAN GROUP | Age: 62
End: 2022-12-06

## 2022-12-06 VITALS
TEMPERATURE: 97.6 F | HEIGHT: 67 IN | BODY MASS INDEX: 29.29 KG/M2 | HEART RATE: 95 BPM | DIASTOLIC BLOOD PRESSURE: 92 MMHG | OXYGEN SATURATION: 97 % | WEIGHT: 186.6 LBS | SYSTOLIC BLOOD PRESSURE: 138 MMHG | RESPIRATION RATE: 14 BRPM

## 2022-12-06 DIAGNOSIS — Z02.4 ENCOUNTER FOR CDL (COMMERCIAL DRIVING LICENSE) EXAM: ICD-10-CM

## 2022-12-06 PROCEDURE — 7100 PR DOT PHYSICAL: Performed by: NURSE PRACTITIONER

## 2022-12-06 ASSESSMENT — FIBROSIS 4 INDEX: FIB4 SCORE: 1.23

## 2022-12-06 NOTE — PROGRESS NOTES
"Subjective:     Kit Rocha is a 62 y.o. male who presents for Other (DOT Annual )      Other    Pt presents for evaluation of a new problem. 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  He does have a history of hypertension.  He takes lisinopril daily for this.  He does note that he has a history of whitecoat syndrome.  Blood pressure was normal at home today 127/80.  He does wear glasses for vision correction.    ROS    PMH:   Past Medical History:   Diagnosis Date    Hypertension      ALLERGIES: No Known Allergies  SURGHX:   Past Surgical History:   Procedure Laterality Date    HERNIA REPAIR       SOCHX:   Social History     Socioeconomic History    Marital status:    Tobacco Use    Smoking status: Never    Smokeless tobacco: Never   Vaping Use    Vaping Use: Never used   Substance and Sexual Activity    Alcohol use: Yes     Comment: Occ    Drug use: No    Sexual activity: Yes     Partners: Female     FH: History reviewed. No pertinent family history.      Objective:   BP (!) 138/92   Pulse 95   Temp 36.4 °C (97.6 °F)   Resp 14   Ht 1.689 m (5' 6.5\")   Wt 84.6 kg (186 lb 9.6 oz)   SpO2 97%   BMI 29.67 kg/m²     Physical Exam  Vitals and nursing note reviewed.   Constitutional:       General: He is not in acute distress.     Appearance: Normal appearance. He is not ill-appearing.   HENT:      Head: Normocephalic and atraumatic.      Right Ear: External ear normal.      Left Ear: External ear normal.      Nose: No congestion or rhinorrhea.      Mouth/Throat:      Mouth: Mucous membranes are moist.   Eyes:      Extraocular Movements: Extraocular movements intact.      Pupils: Pupils are equal, round, and reactive to light.   Cardiovascular:      Rate and Rhythm: Normal rate and regular rhythm.      Pulses: Normal pulses.      Heart sounds: Normal heart sounds.   Pulmonary:      Effort: Pulmonary effort is normal.      Breath sounds: " Normal breath sounds.   Abdominal:      General: Abdomen is flat. Bowel sounds are normal.      Palpations: Abdomen is soft.      Tenderness: There is no abdominal tenderness. There is no right CVA tenderness or left CVA tenderness.   Musculoskeletal:         General: Normal range of motion.      Cervical back: Normal range of motion and neck supple.   Skin:     General: Skin is warm and dry.      Capillary Refill: Capillary refill takes less than 2 seconds.   Neurological:      General: No focal deficit present.      Mental Status: He is alert and oriented to person, place, and time. Mental status is at baseline.   Psychiatric:         Mood and Affect: Mood normal.         Behavior: Behavior normal.         Thought Content: Thought content normal.         Judgment: Judgment normal.       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      AVS handout given and reviewed with patient. Pt educated on red flags and when to seek treatment back in ER or UC.

## 2023-11-24 ENCOUNTER — OFFICE VISIT (OUTPATIENT)
Dept: URGENT CARE | Facility: PHYSICIAN GROUP | Age: 63
End: 2023-11-24

## 2023-11-24 VITALS
HEIGHT: 66 IN | SYSTOLIC BLOOD PRESSURE: 140 MMHG | HEART RATE: 91 BPM | OXYGEN SATURATION: 98 % | BODY MASS INDEX: 31.18 KG/M2 | WEIGHT: 194 LBS | DIASTOLIC BLOOD PRESSURE: 88 MMHG | RESPIRATION RATE: 18 BRPM | TEMPERATURE: 97.7 F

## 2023-11-24 DIAGNOSIS — Z02.89 ENCOUNTER FOR EXAMINATION REQUIRED BY DEPARTMENT OF TRANSPORTATION (DOT): ICD-10-CM

## 2023-11-24 PROCEDURE — 7100 PR DOT PHYSICAL: Performed by: PHYSICIAN ASSISTANT

## 2023-11-24 NOTE — PROGRESS NOTES
63 y.o. male comes in for a DOT physical. Patient with history of Hypertension and on Lisinopril. Patient has history of white coat syndrome. This morning his BP was in the 120s systolic and 70s diastolic at home.  No history of asthma, heart disease, murmurs, seizure disorder or syncopal episodes with activity.  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      Krys Norris P.A.-C.

## 2024-03-23 ENCOUNTER — OFFICE VISIT (OUTPATIENT)
Dept: URGENT CARE | Facility: PHYSICIAN GROUP | Age: 64
End: 2024-03-23
Payer: COMMERCIAL

## 2024-03-23 ENCOUNTER — HOSPITAL ENCOUNTER (OUTPATIENT)
Dept: LAB | Facility: MEDICAL CENTER | Age: 64
End: 2024-03-23
Attending: FAMILY MEDICINE
Payer: COMMERCIAL

## 2024-03-23 VITALS
TEMPERATURE: 97.7 F | BODY MASS INDEX: 30.7 KG/M2 | RESPIRATION RATE: 20 BRPM | WEIGHT: 191 LBS | HEIGHT: 66 IN | SYSTOLIC BLOOD PRESSURE: 142 MMHG | OXYGEN SATURATION: 96 % | HEART RATE: 86 BPM | DIASTOLIC BLOOD PRESSURE: 90 MMHG

## 2024-03-23 DIAGNOSIS — I10 PRIMARY HYPERTENSION: ICD-10-CM

## 2024-03-23 LAB
ALBUMIN SERPL BCP-MCNC: 4.5 G/DL (ref 3.2–4.9)
ALBUMIN/GLOB SERPL: 1.3 G/DL
ALP SERPL-CCNC: 110 U/L (ref 30–99)
ALT SERPL-CCNC: 18 U/L (ref 2–50)
ANION GAP SERPL CALC-SCNC: 13 MMOL/L (ref 7–16)
AST SERPL-CCNC: 21 U/L (ref 12–45)
BASOPHILS # BLD AUTO: 0.6 % (ref 0–1.8)
BASOPHILS # BLD: 0.03 K/UL (ref 0–0.12)
BILIRUB SERPL-MCNC: 0.6 MG/DL (ref 0.1–1.5)
BUN SERPL-MCNC: 16 MG/DL (ref 8–22)
CALCIUM ALBUM COR SERPL-MCNC: 9 MG/DL (ref 8.5–10.5)
CALCIUM SERPL-MCNC: 9.4 MG/DL (ref 8.5–10.5)
CHLORIDE SERPL-SCNC: 107 MMOL/L (ref 96–112)
CHOLEST SERPL-MCNC: 197 MG/DL (ref 100–199)
CO2 SERPL-SCNC: 19 MMOL/L (ref 20–33)
CREAT SERPL-MCNC: 0.77 MG/DL (ref 0.5–1.4)
EOSINOPHIL # BLD AUTO: 0.11 K/UL (ref 0–0.51)
EOSINOPHIL NFR BLD: 2.2 % (ref 0–6.9)
ERYTHROCYTE [DISTWIDTH] IN BLOOD BY AUTOMATED COUNT: 43.8 FL (ref 35.9–50)
GFR SERPLBLD CREATININE-BSD FMLA CKD-EPI: 101 ML/MIN/1.73 M 2
GLOBULIN SER CALC-MCNC: 3.4 G/DL (ref 1.9–3.5)
GLUCOSE SERPL-MCNC: 109 MG/DL (ref 65–99)
HCT VFR BLD AUTO: 49.8 % (ref 42–52)
HDLC SERPL-MCNC: 39 MG/DL
HGB BLD-MCNC: 17.2 G/DL (ref 14–18)
IMM GRANULOCYTES # BLD AUTO: 0.02 K/UL (ref 0–0.11)
IMM GRANULOCYTES NFR BLD AUTO: 0.4 % (ref 0–0.9)
LDLC SERPL CALC-MCNC: 139 MG/DL
LYMPHOCYTES # BLD AUTO: 1.13 K/UL (ref 1–4.8)
LYMPHOCYTES NFR BLD: 23.1 % (ref 22–41)
MCH RBC QN AUTO: 30.8 PG (ref 27–33)
MCHC RBC AUTO-ENTMCNC: 34.5 G/DL (ref 32.3–36.5)
MCV RBC AUTO: 89.2 FL (ref 81.4–97.8)
MONOCYTES # BLD AUTO: 0.41 K/UL (ref 0–0.85)
MONOCYTES NFR BLD AUTO: 8.4 % (ref 0–13.4)
NEUTROPHILS # BLD AUTO: 3.19 K/UL (ref 1.82–7.42)
NEUTROPHILS NFR BLD: 65.3 % (ref 44–72)
NRBC # BLD AUTO: 0 K/UL
NRBC BLD-RTO: 0 /100 WBC (ref 0–0.2)
PLATELET # BLD AUTO: 236 K/UL (ref 164–446)
PMV BLD AUTO: 10.2 FL (ref 9–12.9)
POTASSIUM SERPL-SCNC: 4.9 MMOL/L (ref 3.6–5.5)
PROT SERPL-MCNC: 7.9 G/DL (ref 6–8.2)
PSA SERPL-MCNC: 0.82 NG/ML (ref 0–4)
RBC # BLD AUTO: 5.58 M/UL (ref 4.7–6.1)
SODIUM SERPL-SCNC: 139 MMOL/L (ref 135–145)
TRIGL SERPL-MCNC: 93 MG/DL (ref 0–149)
WBC # BLD AUTO: 4.9 K/UL (ref 4.8–10.8)

## 2024-03-23 PROCEDURE — 84153 ASSAY OF PSA TOTAL: CPT

## 2024-03-23 PROCEDURE — 3077F SYST BP >= 140 MM HG: CPT | Performed by: FAMILY MEDICINE

## 2024-03-23 PROCEDURE — 36415 COLL VENOUS BLD VENIPUNCTURE: CPT

## 2024-03-23 PROCEDURE — 99214 OFFICE O/P EST MOD 30 MIN: CPT | Performed by: FAMILY MEDICINE

## 2024-03-23 PROCEDURE — 80061 LIPID PANEL: CPT

## 2024-03-23 PROCEDURE — 80053 COMPREHEN METABOLIC PANEL: CPT

## 2024-03-23 PROCEDURE — 85025 COMPLETE CBC W/AUTO DIFF WBC: CPT

## 2024-03-23 PROCEDURE — 3080F DIAST BP >= 90 MM HG: CPT | Performed by: FAMILY MEDICINE

## 2024-03-23 RX ORDER — LISINOPRIL 40 MG/1
40 TABLET ORAL
Qty: 90 TABLET | Refills: 0 | Status: SHIPPED | OUTPATIENT
Start: 2024-03-23

## 2024-03-23 NOTE — PROGRESS NOTES
Subjective:      Chief Complaint   Patient presents with    Medication Refill     PCP not available   Lisinopril Rx                Hypertension  This is a chronic problem.       HTN -  Taking lisinopril as prescribed, but ran out a couple of days ago, so BP running high.    No side effects.           Associated symptoms include : none. Pertinent negatives include no anxiety, blurred vision, chest pain, malaise/fatigue, orthopnea, palpitations or shortness of breath. There are no associated agents to hypertension.      Social History     Socioeconomic History    Marital status:      Spouse name: Not on file    Number of children: Not on file    Years of education: Not on file    Highest education level: Not on file   Occupational History    Not on file   Tobacco Use    Smoking status: Never    Smokeless tobacco: Never   Vaping Use    Vaping Use: Never used   Substance and Sexual Activity    Alcohol use: Yes     Comment: Occ    Drug use: No    Sexual activity: Yes     Partners: Female   Other Topics Concern    Not on file   Social History Narrative    Not on file     Social Determinants of Health     Financial Resource Strain: Not on file   Food Insecurity: Not on file   Transportation Needs: Not on file   Physical Activity: Not on file   Stress: Not on file   Social Connections: Not on file   Intimate Partner Violence: Not on file   Housing Stability: Not on file       No current outpatient medications on file prior to visit.     No current facility-administered medications on file prior to visit.         Past Medical History:   Diagnosis Date    Hypertension          Review of Systems   Constitutional: Negative for fever and malaise/fatigue.   Eyes: Negative for blurred vision and double vision.   Respiratory: Negative for cough and shortness of breath.    Cardiovascular: Negative for chest pain, palpitations, orthopnea and claudication.   Gastrointestinal: Negative for nausea and vomiting.   Neurological:  "negative for headaches Negative for dizziness and tingling.   All other systems reviewed and are negative.         Objective:     BP (!) 142/90   Pulse 86   Temp 36.5 °C (97.7 °F) (Temporal)   Resp 20   Ht 1.676 m (5' 6\")   Wt 86.6 kg (191 lb)   SpO2 96%       Physical Exam   Constitutional: pt is oriented to person, place, and time. Pt appears well-developed and well-nourished. No distress.   HENT:   Head: Normocephalic and atraumatic.   Mouth/Throat:   Eyes: Conjunctivae and EOM are normal. Pupils are equal, round, and reactive to light. Right eye exhibits no discharge. Left eye exhibits no discharge. No scleral icterus.   Cardiovascular: Normal rate, regular rhythm, normal heart sounds and intact distal pulses.  Exam reveals no friction rub.    No murmur heard.  Pulmonary/Chest: Effort normal and breath sounds normal. No respiratory distress. Pt has no wheezes. Pt has no rales.   Neurological: pt is alert and oriented to person, place, and time. No cranial nerve deficit.   Skin: Skin is warm. Pt is not diaphoretic. No erythema.   Psychiatric: pt behavior is normal.   Nursing note and vitals reviewed.              Assessment/Plan:     1. Primary hypertension   Not at goal   Will restart on lisinopril       - lisinopril (PRINIVIL) 40 MG tablet; Take 1 Tablet by mouth every day.  Dispense: 90 Tablet; Refill: 0  - Referral back to PCP  - CBC WITH DIFFERENTIAL; Future  - Comp Metabolic Panel; Future  - Lipid Profile; Future  - PROSTATE SPECIFIC AG SCREENING; Future    - lisinopril (PRINIVIL) 40 MG tablet; Take 1 Tablet by mouth every day.  Dispense: 90 Tablet; Refill: 0      "

## 2024-07-03 SDOH — ECONOMIC STABILITY: FOOD INSECURITY: WITHIN THE PAST 12 MONTHS, THE FOOD YOU BOUGHT JUST DIDN'T LAST AND YOU DIDN'T HAVE MONEY TO GET MORE.: PATIENT DECLINED

## 2024-07-03 SDOH — ECONOMIC STABILITY: INCOME INSECURITY: HOW HARD IS IT FOR YOU TO PAY FOR THE VERY BASICS LIKE FOOD, HOUSING, MEDICAL CARE, AND HEATING?: PATIENT DECLINED

## 2024-07-03 SDOH — HEALTH STABILITY: PHYSICAL HEALTH: ON AVERAGE, HOW MANY MINUTES DO YOU ENGAGE IN EXERCISE AT THIS LEVEL?: PATIENT DECLINED

## 2024-07-03 SDOH — ECONOMIC STABILITY: TRANSPORTATION INSECURITY
IN THE PAST 12 MONTHS, HAS THE LACK OF TRANSPORTATION KEPT YOU FROM MEDICAL APPOINTMENTS OR FROM GETTING MEDICATIONS?: PATIENT DECLINED

## 2024-07-03 SDOH — ECONOMIC STABILITY: FOOD INSECURITY: WITHIN THE PAST 12 MONTHS, YOU WORRIED THAT YOUR FOOD WOULD RUN OUT BEFORE YOU GOT MONEY TO BUY MORE.: PATIENT DECLINED

## 2024-07-03 SDOH — HEALTH STABILITY: PHYSICAL HEALTH: ON AVERAGE, HOW MANY DAYS PER WEEK DO YOU ENGAGE IN MODERATE TO STRENUOUS EXERCISE (LIKE A BRISK WALK)?: 5 DAYS

## 2024-07-03 SDOH — ECONOMIC STABILITY: HOUSING INSECURITY
IN THE LAST 12 MONTHS, WAS THERE A TIME WHEN YOU DID NOT HAVE A STEADY PLACE TO SLEEP OR SLEPT IN A SHELTER (INCLUDING NOW)?: PATIENT DECLINED

## 2024-07-03 SDOH — ECONOMIC STABILITY: INCOME INSECURITY: IN THE LAST 12 MONTHS, WAS THERE A TIME WHEN YOU WERE NOT ABLE TO PAY THE MORTGAGE OR RENT ON TIME?: PATIENT DECLINED

## 2024-07-03 SDOH — ECONOMIC STABILITY: HOUSING INSECURITY

## 2024-07-03 SDOH — ECONOMIC STABILITY: TRANSPORTATION INSECURITY
IN THE PAST 12 MONTHS, HAS LACK OF RELIABLE TRANSPORTATION KEPT YOU FROM MEDICAL APPOINTMENTS, MEETINGS, WORK OR FROM GETTING THINGS NEEDED FOR DAILY LIVING?: PATIENT DECLINED

## 2024-07-03 SDOH — HEALTH STABILITY: MENTAL HEALTH
STRESS IS WHEN SOMEONE FEELS TENSE, NERVOUS, ANXIOUS, OR CAN'T SLEEP AT NIGHT BECAUSE THEIR MIND IS TROUBLED. HOW STRESSED ARE YOU?: PATIENT DECLINED

## 2024-07-03 SDOH — ECONOMIC STABILITY: TRANSPORTATION INSECURITY
IN THE PAST 12 MONTHS, HAS LACK OF TRANSPORTATION KEPT YOU FROM MEETINGS, WORK, OR FROM GETTING THINGS NEEDED FOR DAILY LIVING?: PATIENT DECLINED

## 2024-07-03 ASSESSMENT — SOCIAL DETERMINANTS OF HEALTH (SDOH)
HOW OFTEN DO YOU GET TOGETHER WITH FRIENDS OR RELATIVES?: PATIENT DECLINED
HOW HARD IS IT FOR YOU TO PAY FOR THE VERY BASICS LIKE FOOD, HOUSING, MEDICAL CARE, AND HEATING?: PATIENT DECLINED
DO YOU BELONG TO ANY CLUBS OR ORGANIZATIONS SUCH AS CHURCH GROUPS UNIONS, FRATERNAL OR ATHLETIC GROUPS, OR SCHOOL GROUPS?: PATIENT DECLINED
HOW OFTEN DO YOU ATTEND CHURCH OR RELIGIOUS SERVICES?: PATIENT DECLINED
HOW OFTEN DO YOU HAVE SIX OR MORE DRINKS ON ONE OCCASION: PATIENT DECLINED
ARE YOU MARRIED, WIDOWED, DIVORCED, SEPARATED, NEVER MARRIED, OR LIVING WITH A PARTNER?: PATIENT DECLINED
IN A TYPICAL WEEK, HOW MANY TIMES DO YOU TALK ON THE PHONE WITH FAMILY, FRIENDS, OR NEIGHBORS?: PATIENT DECLINED
HOW OFTEN DO YOU ATTENT MEETINGS OF THE CLUB OR ORGANIZATION YOU BELONG TO?: PATIENT DECLINED
HOW MANY DRINKS CONTAINING ALCOHOL DO YOU HAVE ON A TYPICAL DAY WHEN YOU ARE DRINKING: PATIENT DECLINED
HOW OFTEN DO YOU ATTEND CHURCH OR RELIGIOUS SERVICES?: PATIENT DECLINED
DO YOU BELONG TO ANY CLUBS OR ORGANIZATIONS SUCH AS CHURCH GROUPS UNIONS, FRATERNAL OR ATHLETIC GROUPS, OR SCHOOL GROUPS?: PATIENT DECLINED
IN THE PAST 12 MONTHS, HAS THE ELECTRIC, GAS, OIL, OR WATER COMPANY THREATENED TO SHUT OFF SERVICE IN YOUR HOME?: PATIENT DECLINED
ARE YOU MARRIED, WIDOWED, DIVORCED, SEPARATED, NEVER MARRIED, OR LIVING WITH A PARTNER?: PATIENT DECLINED
HOW OFTEN DO YOU ATTENT MEETINGS OF THE CLUB OR ORGANIZATION YOU BELONG TO?: PATIENT DECLINED
HOW OFTEN DO YOU GET TOGETHER WITH FRIENDS OR RELATIVES?: PATIENT DECLINED
WITHIN THE PAST 12 MONTHS, YOU WORRIED THAT YOUR FOOD WOULD RUN OUT BEFORE YOU GOT THE MONEY TO BUY MORE: PATIENT DECLINED
IN A TYPICAL WEEK, HOW MANY TIMES DO YOU TALK ON THE PHONE WITH FAMILY, FRIENDS, OR NEIGHBORS?: PATIENT DECLINED
HOW OFTEN DO YOU HAVE A DRINK CONTAINING ALCOHOL: PATIENT DECLINED

## 2024-07-03 ASSESSMENT — LIFESTYLE VARIABLES
HOW OFTEN DO YOU HAVE A DRINK CONTAINING ALCOHOL: PATIENT DECLINED
HOW OFTEN DO YOU HAVE SIX OR MORE DRINKS ON ONE OCCASION: PATIENT DECLINED
SKIP TO QUESTIONS 9-10: 0
AUDIT-C TOTAL SCORE: -1
HOW MANY STANDARD DRINKS CONTAINING ALCOHOL DO YOU HAVE ON A TYPICAL DAY: PATIENT DECLINED

## 2024-07-05 ENCOUNTER — OFFICE VISIT (OUTPATIENT)
Dept: MEDICAL GROUP | Facility: PHYSICIAN GROUP | Age: 64
End: 2024-07-05
Attending: FAMILY MEDICINE
Payer: COMMERCIAL

## 2024-07-05 VITALS
RESPIRATION RATE: 20 BRPM | WEIGHT: 197.13 LBS | SYSTOLIC BLOOD PRESSURE: 142 MMHG | OXYGEN SATURATION: 97 % | DIASTOLIC BLOOD PRESSURE: 74 MMHG | HEART RATE: 90 BPM | HEIGHT: 66 IN | TEMPERATURE: 98.7 F | BODY MASS INDEX: 31.68 KG/M2

## 2024-07-05 DIAGNOSIS — I10 PRIMARY HYPERTENSION: ICD-10-CM

## 2024-07-05 DIAGNOSIS — Z23 NEED FOR VACCINATION: ICD-10-CM

## 2024-07-05 DIAGNOSIS — R73.01 IMPAIRED FASTING GLUCOSE: ICD-10-CM

## 2024-07-05 DIAGNOSIS — E78.5 DYSLIPIDEMIA: ICD-10-CM

## 2024-07-05 DIAGNOSIS — M25.552 LEFT HIP PAIN: ICD-10-CM

## 2024-07-05 PROCEDURE — 3078F DIAST BP <80 MM HG: CPT

## 2024-07-05 PROCEDURE — 3077F SYST BP >= 140 MM HG: CPT

## 2024-07-05 PROCEDURE — 99214 OFFICE O/P EST MOD 30 MIN: CPT | Mod: 25

## 2024-07-05 PROCEDURE — 90471 IMMUNIZATION ADMIN: CPT

## 2024-07-05 PROCEDURE — 90715 TDAP VACCINE 7 YRS/> IM: CPT

## 2024-07-05 RX ORDER — LISINOPRIL 40 MG/1
40 TABLET ORAL
Qty: 90 TABLET | Refills: 3 | Status: SHIPPED | OUTPATIENT
Start: 2024-07-05

## 2024-07-05 RX ORDER — ROSUVASTATIN CALCIUM 20 MG/1
20 TABLET, COATED ORAL EVERY EVENING
Qty: 90 TABLET | Refills: 1 | Status: SHIPPED | OUTPATIENT
Start: 2024-07-05

## 2024-07-05 ASSESSMENT — ENCOUNTER SYMPTOMS
HEADACHES: 0
SHORTNESS OF BREATH: 0
VOMITING: 0
WEIGHT LOSS: 0
BLOOD IN STOOL: 0
COUGH: 0
DIZZINESS: 0
PALPITATIONS: 0
CONSTIPATION: 0
NAUSEA: 0
ABDOMINAL PAIN: 0
DIARRHEA: 0
FEVER: 0
WHEEZING: 0
TINGLING: 0
CHILLS: 0

## 2024-07-05 ASSESSMENT — FIBROSIS 4 INDEX: FIB4 SCORE: 1.28

## 2024-07-05 ASSESSMENT — PATIENT HEALTH QUESTIONNAIRE - PHQ9: CLINICAL INTERPRETATION OF PHQ2 SCORE: 0

## 2024-08-09 ENCOUNTER — HOSPITAL ENCOUNTER (OUTPATIENT)
Dept: RADIOLOGY | Facility: MEDICAL CENTER | Age: 64
End: 2024-08-09
Payer: COMMERCIAL

## 2024-08-09 DIAGNOSIS — M25.552 LEFT HIP PAIN: ICD-10-CM

## 2024-08-09 PROCEDURE — 73521 X-RAY EXAM HIPS BI 2 VIEWS: CPT

## 2024-10-19 ENCOUNTER — OFFICE VISIT (OUTPATIENT)
Dept: URGENT CARE | Facility: PHYSICIAN GROUP | Age: 64
End: 2024-10-19
Payer: COMMERCIAL

## 2024-10-19 VITALS
HEIGHT: 66 IN | HEART RATE: 95 BPM | BODY MASS INDEX: 31.34 KG/M2 | TEMPERATURE: 98 F | DIASTOLIC BLOOD PRESSURE: 78 MMHG | SYSTOLIC BLOOD PRESSURE: 126 MMHG | OXYGEN SATURATION: 98 % | RESPIRATION RATE: 18 BRPM | WEIGHT: 195 LBS

## 2024-10-19 DIAGNOSIS — B02.9 HERPES ZOSTER WITHOUT COMPLICATION: ICD-10-CM

## 2024-10-19 PROCEDURE — 99213 OFFICE O/P EST LOW 20 MIN: CPT | Performed by: STUDENT IN AN ORGANIZED HEALTH CARE EDUCATION/TRAINING PROGRAM

## 2024-10-19 PROCEDURE — 3074F SYST BP LT 130 MM HG: CPT | Performed by: STUDENT IN AN ORGANIZED HEALTH CARE EDUCATION/TRAINING PROGRAM

## 2024-10-19 PROCEDURE — 3078F DIAST BP <80 MM HG: CPT | Performed by: STUDENT IN AN ORGANIZED HEALTH CARE EDUCATION/TRAINING PROGRAM

## 2024-10-19 RX ORDER — VALACYCLOVIR HYDROCHLORIDE 1 G/1
1000 TABLET, FILM COATED ORAL 3 TIMES DAILY
Qty: 21 TABLET | Refills: 0 | Status: SHIPPED | OUTPATIENT
Start: 2024-10-19 | End: 2024-10-26

## 2024-10-19 ASSESSMENT — FIBROSIS 4 INDEX: FIB4 SCORE: 1.3

## 2024-11-23 ENCOUNTER — OFFICE VISIT (OUTPATIENT)
Dept: URGENT CARE | Facility: PHYSICIAN GROUP | Age: 64
End: 2024-11-23

## 2024-11-23 VITALS
TEMPERATURE: 96.9 F | DIASTOLIC BLOOD PRESSURE: 84 MMHG | HEART RATE: 71 BPM | OXYGEN SATURATION: 98 % | SYSTOLIC BLOOD PRESSURE: 134 MMHG | RESPIRATION RATE: 16 BRPM | HEIGHT: 66 IN | WEIGHT: 194.5 LBS | BODY MASS INDEX: 31.26 KG/M2

## 2024-11-23 DIAGNOSIS — Z02.4 ENCOUNTER FOR CDL (COMMERCIAL DRIVING LICENSE) EXAM: ICD-10-CM

## 2024-11-23 PROCEDURE — 7100 PR DOT PHYSICAL: Performed by: NURSE PRACTITIONER

## 2024-11-23 ASSESSMENT — FIBROSIS 4 INDEX: FIB4 SCORE: 1.3

## 2024-11-23 NOTE — PROGRESS NOTES
"Subjective     Kit Rocha is a 64 y.o. male who presents with Employment Physical ([employer] )            HPI DOT physical    ROS           Objective     /84   Pulse 71   Temp 36.1 °C (96.9 °F) (Temporal)   Resp 16   Ht 1.676 m (5' 6\")   Wt 88.2 kg (194 lb 8 oz)   SpO2 98%   BMI 31.39 kg/m²      Physical Exam                        Assessment & Plan        Assessment & Plan  Encounter for CDL (commercial driving license) exam       cleared for one year with correction,                "

## 2024-12-30 DIAGNOSIS — E78.5 DYSLIPIDEMIA: ICD-10-CM

## 2024-12-30 RX ORDER — ROSUVASTATIN CALCIUM 20 MG/1
20 TABLET, COATED ORAL EVERY EVENING
Qty: 90 TABLET | Refills: 1 | Status: SHIPPED | OUTPATIENT
Start: 2024-12-30

## 2024-12-30 NOTE — TELEPHONE ENCOUNTER
Received request via: Pharmacy    Was the patient seen in the last year in this department? Yes    Does the patient have an active prescription (recently filled or refills available) for medication(s) requested? No    Pharmacy Name:     [id number] - [place], [place] - [address] (Pharmacy) [phone]       Does the patient have skilled nursing Plus and need 100-day supply? (This applies to ALL medications) Patient does not have SCP

## 2025-05-29 ENCOUNTER — APPOINTMENT (OUTPATIENT)
Dept: URBAN - METROPOLITAN AREA CLINIC 31 | Facility: CLINIC | Age: 65
Setting detail: DERMATOLOGY
End: 2025-05-29

## 2025-05-29 DIAGNOSIS — L57.0 ACTINIC KERATOSIS: ICD-10-CM

## 2025-05-29 DIAGNOSIS — D485 NEOPLASM OF UNCERTAIN BEHAVIOR OF SKIN: ICD-10-CM

## 2025-05-29 DIAGNOSIS — L81.4 OTHER MELANIN HYPERPIGMENTATION: ICD-10-CM

## 2025-05-29 DIAGNOSIS — D18.0 HEMANGIOMA: ICD-10-CM

## 2025-05-29 DIAGNOSIS — L57.8 OTHER SKIN CHANGES DUE TO CHRONIC EXPOSURE TO NONIONIZING RADIATION: ICD-10-CM

## 2025-05-29 DIAGNOSIS — L82.1 OTHER SEBORRHEIC KERATOSIS: ICD-10-CM

## 2025-05-29 PROBLEM — D18.01 HEMANGIOMA OF SKIN AND SUBCUTANEOUS TISSUE: Status: ACTIVE | Noted: 2025-05-29

## 2025-05-29 PROBLEM — D48.5 NEOPLASM OF UNCERTAIN BEHAVIOR OF SKIN: Status: ACTIVE | Noted: 2025-05-29

## 2025-05-29 PROCEDURE — ? LIQUID NITROGEN

## 2025-05-29 PROCEDURE — ? BIOPSY BY SHAVE METHOD

## 2025-05-29 PROCEDURE — ? SUNSCREEN RECOMMENDATIONS

## 2025-05-29 PROCEDURE — ? COUNSELING

## 2025-05-29 ASSESSMENT — LOCATION SIMPLE DESCRIPTION DERM
LOCATION SIMPLE: ANTERIOR SCALP
LOCATION SIMPLE: RIGHT OCCIPITAL SCALP
LOCATION SIMPLE: SCALP
LOCATION SIMPLE: LEFT UPPER BACK
LOCATION SIMPLE: LEFT SHOULDER
LOCATION SIMPLE: LEFT SCALP
LOCATION SIMPLE: POSTERIOR SCALP

## 2025-05-29 ASSESSMENT — LOCATION DETAILED DESCRIPTION DERM
LOCATION DETAILED: RIGHT SUPERIOR OCCIPITAL SCALP
LOCATION DETAILED: LEFT POSTERIOR SHOULDER
LOCATION DETAILED: MID-FRONTAL SCALP
LOCATION DETAILED: LEFT SUPERIOR UPPER BACK
LOCATION DETAILED: POSTERIOR MID-PARIETAL SCALP
LOCATION DETAILED: LEFT SUPERIOR PARIETAL SCALP
LOCATION DETAILED: RIGHT SUPERIOR PARIETAL SCALP
LOCATION DETAILED: LEFT MEDIAL FRONTAL SCALP

## 2025-05-29 ASSESSMENT — LOCATION ZONE DERM
LOCATION ZONE: TRUNK
LOCATION ZONE: ARM
LOCATION ZONE: SCALP

## 2025-05-29 NOTE — PROCEDURE: SUNSCREEN RECOMMENDATIONS
Detail Level: Detailed
General Sunscreen Counseling: I recommended a broad spectrum sunscreen with a SPF of 40 or higher. Sunscreens should be applied at least 15 minutes prior to expected sun exposure. I also recommended a lip balm with a sunscreen as well. Sun protective clothing can be used in lieu of sunscreen but must be worn the entire time you are exposed to the sun's rays.
Products Recommended: EltaMD, [employer]ience.\\n*discussed sun protective clothing: sunshirts, sun hats, sunglasses.
Products Recommended: EltaMD, [employer]ience\\nSun protective clothing: sun hats, sun shirts, sun glasses, etc.
General Sunscreen Counseling: I recommended a broad spectrum sunscreen with a SPF of 40 or higher.  Sunscreens should be applied at least 15 minutes prior to expected sun exposure and then repeat application per package instructions. I also recommended a lip balm with a sunscreen as well. Sun protective clothing can be used in lieu of sunscreen but must be worn the entire time you are exposed to the sun's rays.

## 2025-05-29 NOTE — PROCEDURE: LIQUID NITROGEN
Show Aperture Variable?: Yes
Consent: The patient's consent was obtained including but not limited to risks of crusting, scabbing, blistering, scarring, darker or lighter pigmentary change, recurrence, incomplete removal and infection.
Post-Care Instructions: I reviewed with the patient in detail post-care instructions. Patient is to wear sunprotection, and avoid picking at any of the treated lesions. Pt may apply Vaseline to crusted or scabbing areas.
Duration Of Freeze Thaw-Cycle (Seconds): 3
Number Of Freeze-Thaw Cycles: 1 freeze-thaw cycle
Render Note In Bullet Format When Appropriate: No
Application Tool (Optional): Cry-AC
Detail Level: Detailed

## 2025-06-29 DIAGNOSIS — I10 PRIMARY HYPERTENSION: ICD-10-CM

## 2025-06-29 DIAGNOSIS — E78.5 DYSLIPIDEMIA: ICD-10-CM

## 2025-06-30 RX ORDER — ROSUVASTATIN CALCIUM 20 MG/1
20 TABLET, COATED ORAL EVERY EVENING
Qty: 90 TABLET | Refills: 0 | Status: SHIPPED | OUTPATIENT
Start: 2025-06-30

## 2025-06-30 RX ORDER — LISINOPRIL 40 MG/1
40 TABLET ORAL
Qty: 90 TABLET | Refills: 0 | Status: SHIPPED | OUTPATIENT
Start: 2025-06-30

## 2025-06-30 NOTE — TELEPHONE ENCOUNTER
Received request via: Pharmacy    Was the patient seen in the last year in this department? Yes    Does the patient have an active prescription (recently filled or refills available) for medication(s) requested? No    Pharmacy Name: [id number] - [place], [place] - [address] [facility].     Does the patient have intermediate Plus and need 100-day supply? (This applies to ALL medications) Patient does not have SCP

## 2025-07-01 ENCOUNTER — PATIENT MESSAGE (OUTPATIENT)
Dept: MEDICAL GROUP | Facility: PHYSICIAN GROUP | Age: 65
End: 2025-07-01
Payer: COMMERCIAL

## 2025-07-01 DIAGNOSIS — Z13.228 SCREENING FOR ENDOCRINE, METABOLIC AND IMMUNITY DISORDER: Primary | ICD-10-CM

## 2025-07-01 DIAGNOSIS — E55.9 VITAMIN D DEFICIENCY: ICD-10-CM

## 2025-07-01 DIAGNOSIS — Z13.0 SCREENING FOR ENDOCRINE, METABOLIC AND IMMUNITY DISORDER: Primary | ICD-10-CM

## 2025-07-01 DIAGNOSIS — Z13.29 SCREENING FOR ENDOCRINE, METABOLIC AND IMMUNITY DISORDER: Primary | ICD-10-CM

## 2025-07-02 ENCOUNTER — OFFICE VISIT (OUTPATIENT)
Dept: MEDICAL GROUP | Facility: PHYSICIAN GROUP | Age: 65
End: 2025-07-02
Payer: COMMERCIAL

## 2025-07-02 VITALS
OXYGEN SATURATION: 96 % | WEIGHT: 200 LBS | RESPIRATION RATE: 16 BRPM | BODY MASS INDEX: 32.14 KG/M2 | DIASTOLIC BLOOD PRESSURE: 74 MMHG | HEART RATE: 90 BPM | SYSTOLIC BLOOD PRESSURE: 124 MMHG | TEMPERATURE: 97.7 F | HEIGHT: 66 IN

## 2025-07-02 DIAGNOSIS — M25.552 LEFT HIP PAIN: Primary | ICD-10-CM

## 2025-07-02 DIAGNOSIS — E78.5 DYSLIPIDEMIA: ICD-10-CM

## 2025-07-02 DIAGNOSIS — I10 PRIMARY HYPERTENSION: ICD-10-CM

## 2025-07-02 PROCEDURE — 3074F SYST BP LT 130 MM HG: CPT

## 2025-07-02 PROCEDURE — 99214 OFFICE O/P EST MOD 30 MIN: CPT

## 2025-07-02 PROCEDURE — 3078F DIAST BP <80 MM HG: CPT

## 2025-07-02 ASSESSMENT — FIBROSIS 4 INDEX: FIB4 SCORE: 1.3

## 2025-07-02 ASSESSMENT — PATIENT HEALTH QUESTIONNAIRE - PHQ9: CLINICAL INTERPRETATION OF PHQ2 SCORE: 0

## 2025-07-02 NOTE — PROGRESS NOTES
"Subjective:     Chief Complaint   Patient presents with    Medication Refill     History of Present Illness  The patient is a 64-year-old male here for a medication refill.    He reports persistent left hip pain, which he manages with Advil 200 mg. He takes one tablet at night when experiencing soreness and another in the morning on certain days. Occasionally, he takes naproxen 500 mg, which he finds more effective than Advil. However, he has been informed that naproxen can elevate blood pressure. He has an appointment with Dr. Hernandez tomorrow at 8:00 AM. He also mentions that Tylenol does not alleviate his symptoms.    He was unable to complete his lab work today due to having eaten earlier. He plans to return tomorrow morning after his orthopedic appointment. He has been prescribed rosuvastatin and reports no new joint or muscle aches since starting this medication.    He had a skin lesion removed from his shoulder, which was later confirmed to be benign.    Allergies: Patient has no known allergies.  ROS per HPI  Health Maintenance: Completed     Objective:     /74 (BP Location: Left arm, Patient Position: Sitting, BP Cuff Size: Adult)   Pulse 90   Temp 36.5 °C (97.7 °F) (Temporal)   Resp 16   Ht 1.676 m (5' 6\")   Wt 90.7 kg (200 lb)   SpO2 96%   BMI 32.28 kg/m²  Body mass index is 32.28 kg/m².     Physical Exam  Vitals reviewed.   Constitutional:       General: He is not in acute distress.     Appearance: Normal appearance. He is not ill-appearing.   Cardiovascular:      Rate and Rhythm: Normal rate and regular rhythm.      Heart sounds: Normal heart sounds.   Pulmonary:      Effort: Pulmonary effort is normal. No respiratory distress.      Breath sounds: Normal breath sounds.   Skin:     Coloration: Skin is not jaundiced or pale.   Neurological:      General: No focal deficit present.      Mental Status: He is alert and oriented to person, place, and time.   Psychiatric:         Mood and Affect: " Mood normal.         Behavior: Behavior normal.         Thought Content: Thought content normal.         Judgment: Judgment normal.        Results  Labs   - PSA: 2024, Normal    Results for orders placed or performed during the hospital encounter of 03/23/24   PROSTATE SPECIFIC AG SCREENING    Collection Time: 03/23/24  9:50 AM   Result Value Ref Range    Prostatic Specific Antigen Tot 0.82 0.00 - 4.00 ng/mL   Lipid Profile    Collection Time: 03/23/24  9:50 AM   Result Value Ref Range    Cholesterol,Tot 197 100 - 199 mg/dL    Triglycerides 93 0 - 149 mg/dL    HDL 39 (A) >=40 mg/dL     (H) <100 mg/dL   Comp Metabolic Panel    Collection Time: 03/23/24  9:50 AM   Result Value Ref Range    Sodium 139 135 - 145 mmol/L    Potassium 4.9 3.6 - 5.5 mmol/L    Chloride 107 96 - 112 mmol/L    Co2 19 (L) 20 - 33 mmol/L    Anion Gap 13.0 7.0 - 16.0    Glucose 109 (H) 65 - 99 mg/dL    Bun 16 8 - 22 mg/dL    Creatinine 0.77 0.50 - 1.40 mg/dL    Calcium 9.4 8.5 - 10.5 mg/dL    Correct Calcium 9.0 8.5 - 10.5 mg/dL    AST(SGOT) 21 12 - 45 U/L    ALT(SGPT) 18 2 - 50 U/L    Alkaline Phosphatase 110 (H) 30 - 99 U/L    Total Bilirubin 0.6 0.1 - 1.5 mg/dL    Albumin 4.5 3.2 - 4.9 g/dL    Total Protein 7.9 6.0 - 8.2 g/dL    Globulin 3.4 1.9 - 3.5 g/dL    A-G Ratio 1.3 g/dL   CBC WITH DIFFERENTIAL    Collection Time: 03/23/24  9:50 AM   Result Value Ref Range    WBC 4.9 4.8 - 10.8 K/uL    RBC 5.58 4.70 - 6.10 M/uL    Hemoglobin 17.2 14.0 - 18.0 g/dL    Hematocrit 49.8 42.0 - 52.0 %    MCV 89.2 81.4 - 97.8 fL    MCH 30.8 27.0 - 33.0 pg    MCHC 34.5 32.3 - 36.5 g/dL    RDW 43.8 35.9 - 50.0 fL    Platelet Count 236 164 - 446 K/uL    MPV 10.2 9.0 - 12.9 fL    Neutrophils-Polys 65.30 44.00 - 72.00 %    Lymphocytes 23.10 22.00 - 41.00 %    Monocytes 8.40 0.00 - 13.40 %    Eosinophils 2.20 0.00 - 6.90 %    Basophils 0.60 0.00 - 1.80 %    Immature Granulocytes 0.40 0.00 - 0.90 %    Nucleated RBC 0.00 0.00 - 0.20 /100 WBC    Neutrophils  (Absolute) 3.19 1.82 - 7.42 K/uL    Lymphs (Absolute) 1.13 1.00 - 4.80 K/uL    Monos (Absolute) 0.41 0.00 - 0.85 K/uL    Eos (Absolute) 0.11 0.00 - 0.51 K/uL    Baso (Absolute) 0.03 0.00 - 0.12 K/uL    Immature Granulocytes (abs) 0.02 0.00 - 0.11 K/uL    NRBC (Absolute) 0.00 K/uL   ESTIMATED GFR    Collection Time: 03/23/24  9:50 AM   Result Value Ref Range    GFR (CKD-EPI) 101 >60 mL/min/1.73 m 2      Assessment and Plan:     The following treatment plan was discussed through shared decision making with the patient:    1. Left hip pain  Referral to Orthopedics      2. Dyslipidemia        3. Primary hypertension          Assessment & Plan  1. Left hip pain.  - Significant degenerative disease in the left hip.  - Referral to orthopedics for Dr. Hernandez has been initiated.  - Advised to continue with either Advil or naproxen, but not both concurrently.    2. Medication management: hypertension/HLD  - Blood pressure is well-controlled today.  - Currently on rosuvastatin; no new joint or muscle aches reported.  - PSA levels were within the normal range last year.  - Lab tests ordered to monitor liver function, kidney function, CBC, vitamin D, thyroid, cholesterol, and A1c levels; no changes to current medication regimen if lab results are satisfactory.      Return in about 6 months (around 1/2/2026) for Med Check, Labs.       Please note that this note was created using dictation with voice recognition software. I have made every reasonable attempt to correct obvious errors, but I expect that there are errors of grammar and possibly content that I did not discover before finalizing the note.    TORIBIO Alvarez  [facility]  [facility]

## 2025-07-02 NOTE — Clinical Note
REFERRAL APPROVAL NOTICE         Sent on July 2, 2025                   Kit Rocha  [address] [facility] [address]                   Dear Mr. Rocha,    After a careful review of the medical information and benefit coverage, [facility] has processed your referral. See below for additional details.    If applicable, you must be actively enrolled with your insurance for coverage of the authorized service. If you have any questions regarding your coverage, please contact your insurance directly.    REFERRAL INFORMATION   Referral #:  [id number]  Referred-To Department    Referred-By Provider:  Orthopedics    DANIELE Alvarez   [facility] (joint)      [address]  [facility] [id number] [phone] [address] [facility] [address]  [phone]    Referral Start Date:  07/02/2025  Referral End Date:   07/02/2026             SCHEDULING  If you do not already have an appointment, please call [phone] to make an appointment.     MORE INFORMATION  If you do not already have a [employer] account, sign up at: [email or web address]  You can access your medical information, make appointments, see lab results, billing information, and more.  If you have questions regarding this referral, please contact  the [facility] Referrals department at:             [phone]. Monday - Friday 8:00AM - 5:00PM.     Sincerely,    [facility]

## 2025-07-03 ENCOUNTER — HOSPITAL ENCOUNTER (OUTPATIENT)
Dept: LAB | Facility: MEDICAL CENTER | Age: 65
End: 2025-07-03
Payer: COMMERCIAL

## 2025-07-03 DIAGNOSIS — Z13.29 SCREENING FOR ENDOCRINE, METABOLIC AND IMMUNITY DISORDER: ICD-10-CM

## 2025-07-03 DIAGNOSIS — R73.01 IMPAIRED FASTING GLUCOSE: ICD-10-CM

## 2025-07-03 DIAGNOSIS — E78.5 DYSLIPIDEMIA: ICD-10-CM

## 2025-07-03 DIAGNOSIS — Z13.0 SCREENING FOR ENDOCRINE, METABOLIC AND IMMUNITY DISORDER: ICD-10-CM

## 2025-07-03 DIAGNOSIS — Z13.228 SCREENING FOR ENDOCRINE, METABOLIC AND IMMUNITY DISORDER: ICD-10-CM

## 2025-07-03 DIAGNOSIS — E55.9 VITAMIN D DEFICIENCY: ICD-10-CM

## 2025-07-03 LAB
25(OH)D3 SERPL-MCNC: 28 NG/ML (ref 30–100)
ALBUMIN SERPL BCP-MCNC: 4.2 G/DL (ref 3.2–4.9)
ALBUMIN/GLOB SERPL: 1.4 G/DL
ALP SERPL-CCNC: 91 U/L (ref 30–99)
ALT SERPL-CCNC: 31 U/L (ref 2–50)
ANION GAP SERPL CALC-SCNC: 12 MMOL/L (ref 7–16)
AST SERPL-CCNC: 28 U/L (ref 12–45)
BASOPHILS # BLD AUTO: 0.7 % (ref 0–1.8)
BASOPHILS # BLD: 0.05 K/UL (ref 0–0.12)
BILIRUB SERPL-MCNC: 0.3 MG/DL (ref 0.1–1.5)
BUN SERPL-MCNC: 15 MG/DL (ref 8–22)
CALCIUM ALBUM COR SERPL-MCNC: 9 MG/DL (ref 8.5–10.5)
CALCIUM SERPL-MCNC: 9.2 MG/DL (ref 8.5–10.5)
CHLORIDE SERPL-SCNC: 105 MMOL/L (ref 96–112)
CHOLEST SERPL-MCNC: 139 MG/DL (ref 100–199)
CO2 SERPL-SCNC: 20 MMOL/L (ref 20–33)
CREAT SERPL-MCNC: 0.76 MG/DL (ref 0.5–1.4)
EOSINOPHIL # BLD AUTO: 0.18 K/UL (ref 0–0.51)
EOSINOPHIL NFR BLD: 2.5 % (ref 0–6.9)
ERYTHROCYTE [DISTWIDTH] IN BLOOD BY AUTOMATED COUNT: 47.6 FL (ref 35.9–50)
EST. AVERAGE GLUCOSE BLD GHB EST-MCNC: 123 MG/DL
GFR SERPLBLD CREATININE-BSD FMLA CKD-EPI: 101 ML/MIN/1.73 M 2
GLOBULIN SER CALC-MCNC: 3 G/DL (ref 1.9–3.5)
GLUCOSE SERPL-MCNC: 102 MG/DL (ref 65–99)
HBA1C MFR BLD: 5.9 % (ref 4–5.6)
HCT VFR BLD AUTO: 48.9 % (ref 42–52)
HDLC SERPL-MCNC: 41 MG/DL
HGB BLD-MCNC: 16 G/DL (ref 14–18)
IMM GRANULOCYTES # BLD AUTO: 0.05 K/UL (ref 0–0.11)
IMM GRANULOCYTES NFR BLD AUTO: 0.7 % (ref 0–0.9)
LDLC SERPL CALC-MCNC: 77 MG/DL
LYMPHOCYTES # BLD AUTO: 1.72 K/UL (ref 1–4.8)
LYMPHOCYTES NFR BLD: 24 % (ref 22–41)
MCH RBC QN AUTO: 30.4 PG (ref 27–33)
MCHC RBC AUTO-ENTMCNC: 32.7 G/DL (ref 32.3–36.5)
MCV RBC AUTO: 93 FL (ref 81.4–97.8)
MONOCYTES # BLD AUTO: 0.49 K/UL (ref 0–0.85)
MONOCYTES NFR BLD AUTO: 6.8 % (ref 0–13.4)
NEUTROPHILS # BLD AUTO: 4.68 K/UL (ref 1.82–7.42)
NEUTROPHILS NFR BLD: 65.3 % (ref 44–72)
NRBC # BLD AUTO: 0 K/UL
NRBC BLD-RTO: 0 /100 WBC (ref 0–0.2)
PLATELET # BLD AUTO: 222 K/UL (ref 164–446)
PMV BLD AUTO: 11 FL (ref 9–12.9)
POTASSIUM SERPL-SCNC: 4.7 MMOL/L (ref 3.6–5.5)
PROT SERPL-MCNC: 7.2 G/DL (ref 6–8.2)
RBC # BLD AUTO: 5.26 M/UL (ref 4.7–6.1)
SODIUM SERPL-SCNC: 137 MMOL/L (ref 135–145)
TRIGL SERPL-MCNC: 105 MG/DL (ref 0–149)
TSH SERPL DL<=0.005 MIU/L-ACNC: 2.13 UIU/ML (ref 0.38–5.33)
WBC # BLD AUTO: 7.2 K/UL (ref 4.8–10.8)

## 2025-07-03 PROCEDURE — 83036 HEMOGLOBIN GLYCOSYLATED A1C: CPT

## 2025-07-03 PROCEDURE — 36415 COLL VENOUS BLD VENIPUNCTURE: CPT

## 2025-07-03 PROCEDURE — 85025 COMPLETE CBC W/AUTO DIFF WBC: CPT

## 2025-07-03 PROCEDURE — 80053 COMPREHEN METABOLIC PANEL: CPT

## 2025-07-03 PROCEDURE — 80061 LIPID PANEL: CPT

## 2025-07-03 PROCEDURE — 84443 ASSAY THYROID STIM HORMONE: CPT

## 2025-07-03 PROCEDURE — 82306 VITAMIN D 25 HYDROXY: CPT
